# Patient Record
Sex: MALE | Race: WHITE | URBAN - METROPOLITAN AREA
[De-identification: names, ages, dates, MRNs, and addresses within clinical notes are randomized per-mention and may not be internally consistent; named-entity substitution may affect disease eponyms.]

---

## 2018-04-24 PROBLEM — E55.9 VITAMIN D DEFICIENCY: Status: ACTIVE | Noted: 2018-04-24

## 2018-04-24 PROBLEM — K64.8 OTHER HEMORRHOIDS: Status: ACTIVE | Noted: 2018-04-24

## 2018-12-07 ENCOUNTER — HOSPITAL ENCOUNTER (OUTPATIENT)
Age: 33
Setting detail: SPECIMEN
Discharge: HOME | End: 2018-12-07
Payer: COMMERCIAL

## 2018-12-12 LAB — SURGICAL PATHOLOGY REPORT: NORMAL

## 2020-04-09 ENCOUNTER — TELEMEDICINE (OUTPATIENT)
Dept: FAMILY MEDICINE CLINIC | Age: 35
End: 2020-04-09
Payer: COMMERCIAL

## 2020-04-09 VITALS
HEART RATE: 77 BPM | HEIGHT: 68 IN | DIASTOLIC BLOOD PRESSURE: 77 MMHG | BODY MASS INDEX: 22.73 KG/M2 | WEIGHT: 150 LBS | SYSTOLIC BLOOD PRESSURE: 126 MMHG

## 2020-04-09 PROCEDURE — G8428 CUR MEDS NOT DOCUMENT: HCPCS | Performed by: FAMILY MEDICINE

## 2020-04-09 PROCEDURE — 99202 OFFICE O/P NEW SF 15 MIN: CPT | Performed by: FAMILY MEDICINE

## 2020-04-09 ASSESSMENT — ENCOUNTER SYMPTOMS
SHORTNESS OF BREATH: 0
DIARRHEA: 0
COUGH: 0
EYES NEGATIVE: 1
ALLERGIC/IMMUNOLOGIC NEGATIVE: 1
CONSTIPATION: 0
BLOOD IN STOOL: 1
ABDOMINAL PAIN: 1

## 2020-04-09 NOTE — PROGRESS NOTES
Emotionally abused: None     Physically abused: None     Forced sexual activity: None   Other Topics Concern    None   Social History Narrative    None       SURGICAL HISTORY:    Past Surgical History:   Procedure Laterality Date    ADENOIDECTOMY      COLONOSCOPY  2018    Dr. Jamison Ruiz:    No current outpatient medications on file. No current facility-administered medications for this visit. ALLERGIES:   No Known Allergies    PHYSICAL EXAM:   Physical Exam  Vitals signs reviewed. Constitutional:       Appearance: Normal appearance. HENT:      Head: Normocephalic. Eyes:      Extraocular Movements: Extraocular movements intact. Conjunctiva/sclera: Conjunctivae normal.   Neck:      Musculoskeletal: Normal range of motion. Pulmonary:      Effort: Pulmonary effort is normal.   Musculoskeletal: Normal range of motion. Neurological:      General: No focal deficit present. Mental Status: He is alert and oriented to person, place, and time. Mental status is at baseline. Psychiatric:         Mood and Affect: Mood normal.         Behavior: Behavior normal.         Thought Content: Thought content normal.         Judgment: Judgment normal.          ASSESSMENT/PLAN:  1. Kidney cysts  Chronic, stable, continue current medication and/or plan  Get labs and US when able to get to lab   - Comprehensive Metabolic Panel; Future  - US RENAL COMPLETE; Future    2. Kidney stones  Report any sx of concern. - Comprehensive Metabolic Panel; Future  - US RENAL COMPLETE; Future    3. Establishing care with new doctor, encounter for  Reviewed history in record and with pt    4. FH: early coronary artery disease  Cont healthy lifestyle. - Lipid Panel; Future      Return in about 2 months (around 6/9/2020), or if symptoms worsen or fail to improve, for renal cysts.     Tom Arguello is a 38 y.o. male being evaluated by a Virtual Visit (video visit)

## 2020-05-12 RX ORDER — HYDROCORTISONE ACETATE 25 MG/1
25 SUPPOSITORY RECTAL 2 TIMES DAILY
Qty: 28 SUPPOSITORY | Refills: 1 | Status: SHIPPED | OUTPATIENT
Start: 2020-05-12 | End: 2020-05-26

## 2020-06-18 LAB
ALBUMIN SERPL-MCNC: NORMAL G/DL
ALP BLD-CCNC: NORMAL U/L
ALT SERPL-CCNC: NORMAL U/L
ANION GAP SERPL CALCULATED.3IONS-SCNC: NORMAL MMOL/L
AST SERPL-CCNC: NORMAL U/L
BILIRUB SERPL-MCNC: NORMAL MG/DL
BUN BLDV-MCNC: NORMAL MG/DL
CALCIUM SERPL-MCNC: NORMAL MG/DL
CHLORIDE BLD-SCNC: NORMAL MMOL/L
CHOLESTEROL, TOTAL: NORMAL
CHOLESTEROL/HDL RATIO: NORMAL
CO2: NORMAL
CREAT SERPL-MCNC: NORMAL MG/DL
GFR CALCULATED: NORMAL
GLUCOSE BLD-MCNC: NORMAL MG/DL
HDLC SERPL-MCNC: NORMAL MG/DL
LDL CHOLESTEROL CALCULATED: NORMAL
POTASSIUM SERPL-SCNC: NORMAL MMOL/L
SODIUM BLD-SCNC: NORMAL MMOL/L
TOTAL PROTEIN: NORMAL
TRIGL SERPL-MCNC: NORMAL MG/DL
VLDLC SERPL CALC-MCNC: NORMAL MG/DL

## 2020-06-26 ENCOUNTER — TELEPHONE (OUTPATIENT)
Dept: FAMILY MEDICINE CLINIC | Age: 35
End: 2020-06-26

## 2020-06-26 NOTE — TELEPHONE ENCOUNTER
Pt called to discuss his test results. He's concerned about his liver enzymes and triglycerides being elevated and want to know if this could mean he has a fatty liver? Also if he drinks just one beer he feels really intoxicated and wants to know this means something could be wrong with his liver? Should he get further testing done?  Please advise

## 2020-07-14 ENCOUNTER — TELEPHONE (OUTPATIENT)
Dept: FAMILY MEDICINE CLINIC | Age: 35
End: 2020-07-14

## 2020-07-14 NOTE — TELEPHONE ENCOUNTER
Wife called in for pt wanting antibody test faxed over to [facility] today so they can both be tested.

## 2020-07-21 ENCOUNTER — OFFICE VISIT (OUTPATIENT)
Dept: FAMILY MEDICINE CLINIC | Age: 35
End: 2020-07-21
Payer: COMMERCIAL

## 2020-07-21 VITALS
HEART RATE: 91 BPM | HEIGHT: 69 IN | SYSTOLIC BLOOD PRESSURE: 115 MMHG | DIASTOLIC BLOOD PRESSURE: 79 MMHG | BODY MASS INDEX: 22.81 KG/M2 | OXYGEN SATURATION: 97 % | WEIGHT: 154 LBS | TEMPERATURE: 97.4 F

## 2020-07-21 PROCEDURE — G8420 CALC BMI NORM PARAMETERS: HCPCS | Performed by: FAMILY MEDICINE

## 2020-07-21 PROCEDURE — G8427 DOCREV CUR MEDS BY ELIG CLIN: HCPCS | Performed by: FAMILY MEDICINE

## 2020-07-21 PROCEDURE — 99213 OFFICE O/P EST LOW 20 MIN: CPT | Performed by: FAMILY MEDICINE

## 2020-07-21 PROCEDURE — 1036F TOBACCO NON-USER: CPT | Performed by: FAMILY MEDICINE

## 2020-07-21 RX ORDER — CEPHALEXIN 500 MG/1
500 CAPSULE ORAL 3 TIMES DAILY
Qty: 21 CAPSULE | Refills: 0 | Status: SHIPPED | OUTPATIENT
Start: 2020-07-21 | End: 2020-07-28

## 2020-07-21 ASSESSMENT — ENCOUNTER SYMPTOMS
ALLERGIC/IMMUNOLOGIC NEGATIVE: 1
ABDOMINAL PAIN: 0
ROS SKIN COMMENTS: SEE HPI
COUGH: 0
SHORTNESS OF BREATH: 0
EYES NEGATIVE: 1

## 2020-07-21 NOTE — PROGRESS NOTES
[facility]  [facility]  [address]  [address] [address]  Dept: [phone]    7/21/2020    CHIEF COMPLAINT    Chief Complaint   Patient presents with    Insect Bite       HPI    Dana Reza is a 38 y.o. male who presents   Chief Complaint   Patient presents with   [address]   . Bitten on rt lower leg 2 days ago while mowing his lawn. [place] something bite him that he swatted away. Area has become bright red with a black center. Redness moving down toward medial ankle. No fever. No systemic sx other than some fatigue. Vitals:    07/21/20 1345   BP: 115/79   Site: Left Upper Arm   Position: Sitting   Cuff Size: Medium Adult   Pulse: 91   Temp: 97.4 °F (36.3 °C)   TempSrc: Temporal   SpO2: 97%   Weight: 154 lb (69.9 kg)   Height: 5' 9\" (1.753 m)       REVIEW OF SYSTEMS    Review of Systems   Constitutional: Positive for fatigue (mild). Negative for fever and unexpected weight change. HENT: Negative. Eyes: Negative. Respiratory: Negative for cough and shortness of breath. Cardiovascular: Negative for chest pain and leg swelling. Gastrointestinal: Negative for abdominal pain. Endocrine: Negative. Musculoskeletal: Negative. Negative for arthralgias. Skin: Negative. See hpi   Allergic/Immunologic: Negative. Neurological: Negative for dizziness and headaches. Hematological: Negative.         PAST MEDICAL HISTORY    Past Medical History:   Diagnosis Date    Alcohol intolerance     Cold sore     FH: early coronary artery disease     father and paternal relatives    Kidney cysts     but not polycystic kidney per urology    Kidney stones        FAMILY HISTORY    Family History   Problem Relation Age of Onset    Heart Disease Maternal Grandfather     Heart Disease Paternal Grandmother     Anxiety Disorder Paternal Grandmother     Dementia Paternal Grandmother     Dementia Paternal Grandfather     Heart Disease Paternal Uncle     High Cholesterol Maternal Grandmother     Other Maternal Grandmother         peripheral vascular    Heart Disease Father 54    High Blood Pressure Father     High Cholesterol Father     Heart Attack Father 72    Cancer Maternal Uncle 48        liver    High Cholesterol Maternal Uncle     Heart Disease Paternal Cousin 44    Diabetes Maternal Uncle 67       SOCIAL HISTORY    Social History     Socioeconomic History    Marital status:      Spouse name: Shayna    Number of children: 2    Years of education: None    Highest education level: None   Occupational History    Occupation:      Employer: [employer]     Comment: electrical engineering   Social Needs    Financial resource strain: None    Food insecurity     Worry: None     Inability: None    Transportation needs     Medical: None     Non-medical: None   Tobacco Use    Smoking status: Never Smoker    Smokeless tobacco: Never Used   Substance and Sexual Activity    Alcohol use:  Yes     Alcohol/week: 2.0 standard drinks     Types: 2 Cans of beer per week     Comment: 2x weekly ( 3 weekly)    Drug use: No    Sexual activity: Yes     Partners: Female   Lifestyle    Physical activity     Days per week: None     Minutes per session: None    Stress: None   Relationships    Social connections     Talks on phone: None     Gets together: None     Attends [employer]ology service: None     Active member of club or organization: None     Attends meetings of clubs or organizations: None     Relationship status: None    Intimate partner violence     Fear of current or ex partner: None     Emotionally abused: None     Physically abused: None     Forced sexual activity: None   Other Topics Concern    None   Social History Narrative    None       SURGICAL HISTORY    Past Surgical History:   Procedure Laterality Date    ADENOIDECTOMY      COLONOSCOPY  2018    Dr. Arjun Devries MEDICATIONS    Current Outpatient Medications   Medication Sig Dispense Refill    mupirocin (BACTROBAN) 2 % ointment Apply 3 times daily. 30 g 0    cephALEXin (KEFLEX) 500 MG capsule Take 1 capsule by mouth 3 times daily for 7 days 21 capsule 0     No current facility-administered medications for this visit. ALLERGIES    No Known Allergies        PHYSICAL EXAM   Physical Exam  Vitals signs reviewed. Constitutional:       Appearance: He is well-developed. HENT:      Head: Normocephalic. Eyes:      Pupils: Pupils are equal, round, and reactive to light. Neck:      Musculoskeletal: Normal range of motion and neck supple. Thyroid: No thyromegaly. Cardiovascular:      Rate and Rhythm: Normal rate and regular rhythm. Heart sounds: Normal heart sounds. No murmur. Pulmonary:      Effort: Pulmonary effort is normal.      Breath sounds: Normal breath sounds. No wheezing or rales. Abdominal:      Palpations: Abdomen is soft. Musculoskeletal: Normal range of motion. General: No tenderness or deformity. Lymphadenopathy:      Cervical: No cervical adenopathy. Skin:     General: Skin is warm and dry. Comments: See photo   Neurological:      Mental Status: He is alert and oriented to person, place, and time. Psychiatric:         Behavior: Behavior normal.         Assessment/PLan  1. Spider bite wound, accidental or unintentional, initial encounter  Suspect spider bite. Watch for increasing dark center which could be necrosis. Monitor for enlarging area of erythema. Call or return if worse or not improving. Keep clean. dtap up to date.   - mupirocin (BACTROBAN) 2 % ointment; Apply 3 times daily. Dispense: 30 g; Refill: 0  - cephALEXin (KEFLEX) 500 MG capsule; Take 1 capsule by mouth 3 times daily for 7 days  Dispense: 21 capsule;  Refill: 0      Alex received counseling on the following healthy behaviors: medication adherence  Reviewed prior labs and health maintenance. Continue current medications, diet and exercise. Discussed use, benefit, and side effects of prescribed medications. Barriers to medication compliance addressed. Patient given educational materials - see patient instructions. All patient questions answered. Patient voiced understanding. Return if symptoms worsen or fail to improve.         Electronically signed by Lynsey Holloway MD on 7/21/20 at 1:57 PM EDT

## 2020-07-22 ENCOUNTER — TELEPHONE (OUTPATIENT)
Dept: FAMILY MEDICINE CLINIC | Age: 35
End: 2020-07-22

## 2020-07-22 NOTE — TELEPHONE ENCOUNTER
Wife sent message that his bite was worse. He needs to go to ER or see derm today. Don't know if he can get in anywhere so may have to go to ER. Please advise pt.

## 2022-06-22 ENCOUNTER — HOSPITAL ENCOUNTER (OUTPATIENT)
Age: 37
Setting detail: SPECIMEN
Discharge: HOME | End: 2022-06-22

## 2022-06-22 LAB
ABSOLUTE EOS #: <0.03 K/UL (ref 0–0.44)
ABSOLUTE IMMATURE GRANULOCYTE: <0.03 K/UL (ref 0–0.3)
ABSOLUTE LYMPH #: 1.87 K/UL (ref 1.1–3.7)
ABSOLUTE MONO #: 0.46 K/UL (ref 0.1–1.2)
ALT SERPL-CCNC: 27 U/L (ref 5–41)
BASOPHILS # BLD: 1 % (ref 0–2)
BASOPHILS ABSOLUTE: 0.03 K/UL (ref 0–0.2)
CREAT SERPL-MCNC: 0.82 MG/DL (ref 0.7–1.2)
EOSINOPHILS RELATIVE PERCENT: 0 % (ref 1–4)
GFR AFRICAN AMERICAN: >60 ML/MIN
GFR NON-AFRICAN AMERICAN: >60 ML/MIN
GFR SERPL CREATININE-BSD FRML MDRD: NORMAL ML/MIN/{1.73_M2}
HCT VFR BLD CALC: 46.2 % (ref 40.7–50.3)
HEMOGLOBIN: 16.1 G/DL (ref 13–17)
IMMATURE GRANULOCYTES: 0 %
LYMPHOCYTES # BLD: 37 % (ref 24–43)
MCH RBC QN AUTO: 30.1 PG (ref 25.2–33.5)
MCHC RBC AUTO-ENTMCNC: 34.8 G/DL (ref 28.4–34.8)
MCV RBC AUTO: 86.4 FL (ref 82.6–102.9)
MONOCYTES # BLD: 9 % (ref 3–12)
NRBC AUTOMATED: 0 PER 100 WBC
PDW BLD-RTO: 12.8 % (ref 11.8–14.4)
PLATELET # BLD: 239 K/UL (ref 138–453)
PMV BLD AUTO: 9.7 FL (ref 8.1–13.5)
RBC # BLD: 5.35 M/UL (ref 4.21–5.77)
RHEUMATOID FACTOR: <10 IU/ML
SEDIMENTATION RATE, ERYTHROCYTE: 1 MM/HR (ref 0–15)
SEG NEUTROPHILS: 53 % (ref 36–65)
SEGMENTED NEUTROPHILS ABSOLUTE COUNT: 2.65 K/UL (ref 1.5–8.1)
WBC # BLD: 5 K/UL (ref 3.5–11.3)

## 2022-06-23 LAB
ALLERGEN HONEY BEE IGE: <0.1 KU/L (ref 0–0.34)
ALLERGEN WHITE-FACED HORNET IGE: <0.1 KU/L (ref 0–0.34)
ANTI DNA DOUBLE STRANDED: <0.5 IU/ML
ANTI-NUCLEAR ANTIBODY (ANA): NEGATIVE
COMMON WASP, YELLOW JACKET IGE: 0.13 KU/L (ref 0–0.34)
ENA ANTIBODIES SCREEN: 0.1 U/ML
LATEX IGE: <0.1 KU/L (ref 0–0.34)
PAPER WASP IGE CLASS: <0.1 KU/L (ref 0–0.34)
YELLOW HORNET IGE: <0.1 KU/L (ref 0–0.34)

## 2022-06-25 LAB — TRYPTASE: 5.5 UG/L

## 2023-07-24 ENCOUNTER — OFFICE VISIT (OUTPATIENT)
Age: 38
End: 2023-07-24
Payer: COMMERCIAL

## 2023-07-24 VITALS
HEIGHT: 68 IN | SYSTOLIC BLOOD PRESSURE: 144 MMHG | WEIGHT: 150.4 LBS | DIASTOLIC BLOOD PRESSURE: 80 MMHG | HEART RATE: 74 BPM | BODY MASS INDEX: 22.79 KG/M2
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
## 2023-07-24 DIAGNOSIS — R20.0 NUMBNESS AND TINGLING IN LEFT ARM: ICD-10-CM

## 2023-07-24 DIAGNOSIS — M54.6 ACUTE BILATERAL THORACIC BACK PAIN: ICD-10-CM

## 2023-07-24 DIAGNOSIS — M54.9 ACUTE UPPER BACK PAIN: Primary | ICD-10-CM

## 2023-07-24 DIAGNOSIS — R20.2 NUMBNESS AND TINGLING IN LEFT ARM: ICD-10-CM

## 2023-07-24 PROCEDURE — 99213 OFFICE O/P EST LOW 20 MIN: CPT | Performed by: FAMILY MEDICINE

## 2023-07-24 RX ORDER — VALACYCLOVIR HYDROCHLORIDE 1 G/1
2 TABLET, FILM COATED ORAL DAILY PRN
COMMUNITY
Start: 2022-04-01

## 2023-07-24 RX ORDER — CHLORAL HYDRATE 500 MG
1 CAPSULE ORAL DAILY
COMMUNITY

## 2023-07-24 RX ORDER — LEVOCETIRIZINE DIHYDROCHLORIDE 5 MG/1
1 TABLET, FILM COATED ORAL EVERY 24 HOURS
COMMUNITY
End: 2023-07-24

## 2023-07-24 RX ORDER — PANTOPRAZOLE SODIUM 40 MG/1
40 TABLET, DELAYED RELEASE ORAL DAILY
COMMUNITY
Start: 2023-04-26 | End: 2023-07-24

## 2023-07-24 RX ORDER — CYCLOBENZAPRINE HCL 10 MG
10 TABLET ORAL EVERY 8 HOURS PRN
COMMUNITY
Start: 2023-07-22 | End: 2023-07-25

## 2023-07-24 RX ORDER — MULTIVIT-MIN/IRON/FOLIC ACID/K 18-600-40
1 CAPSULE ORAL DAILY
COMMUNITY

## 2023-07-24 RX ORDER — IBUPROFEN 200 MG
1 TABLET ORAL 3 TIMES DAILY PRN
COMMUNITY

## 2023-07-24 RX ORDER — CREATINE MONOHYDRATE 100 %
5 POWDER (GRAM) MISCELLANEOUS DAILY
COMMUNITY

## 2023-07-24 RX ORDER — ACETAMINOPHEN, ASPIRIN AND CAFFEINE 250; 250; 65 MG/1; MG/1; MG/1
2 TABLET, FILM COATED ORAL DAILY PRN
COMMUNITY

## 2023-07-24 SDOH — ECONOMIC STABILITY: FOOD INSECURITY: WITHIN THE PAST 12 MONTHS, YOU WORRIED THAT YOUR FOOD WOULD RUN OUT BEFORE YOU GOT MONEY TO BUY MORE.: NEVER TRUE

## 2023-07-24 SDOH — ECONOMIC STABILITY: HOUSING INSECURITY
IN THE LAST 12 MONTHS, WAS THERE A TIME WHEN YOU DID NOT HAVE A STEADY PLACE TO SLEEP OR SLEPT IN A SHELTER (INCLUDING NOW)?: NO

## 2023-07-24 SDOH — ECONOMIC STABILITY: FOOD INSECURITY: WITHIN THE PAST 12 MONTHS, THE FOOD YOU BOUGHT JUST DIDN'T LAST AND YOU DIDN'T HAVE MONEY TO GET MORE.: NEVER TRUE

## 2023-07-24 SDOH — ECONOMIC STABILITY: INCOME INSECURITY: HOW HARD IS IT FOR YOU TO PAY FOR THE VERY BASICS LIKE FOOD, HOUSING, MEDICAL CARE, AND HEATING?: NOT HARD AT ALL

## 2023-07-24 ASSESSMENT — PATIENT HEALTH QUESTIONNAIRE - PHQ9
SUM OF ALL RESPONSES TO PHQ9 QUESTIONS 1 & 2: 0
SUM OF ALL RESPONSES TO PHQ QUESTIONS 1-9: 0
1. LITTLE INTEREST OR PLEASURE IN DOING THINGS: 0
SUM OF ALL RESPONSES TO PHQ QUESTIONS 1-9: 0
SUM OF ALL RESPONSES TO PHQ QUESTIONS 1-9: 0
2. FEELING DOWN, DEPRESSED OR HOPELESS: 0
SUM OF ALL RESPONSES TO PHQ QUESTIONS 1-9: 0

## 2023-07-24 NOTE — PATIENT INSTRUCTIONS
Thank you for following up with us at [facility] outpatient residency clinic! It was a pleasure to meet you today! Our plan is the following:  - I'll see you in one week to recheck the strength and sensation in your left hand. - Keep using ice/heat and OTC NSAIDs to help with the pain. You can do low-impact cardio for exercise, but I want you to avoid any weight-lifting. If you have any additional questions or concerns, please call the office ([phone]) and speak to one of the staff. They will triage and forward the message to the doctors! Have a great rest of your day!

## 2023-07-24 NOTE — PROGRESS NOTES
[address] [facility] Medicine Residency  [address] [place] [address], [facility]  Phone: [phone]  Fax: [phone]      Date of Visit:  2023  Patient Name: Lona Osorio   Patient :  1985     HPI:     Lona Osorio is a 34 y.o. male who presents today to discuss   Chief Complaint   Patient presents with    Back Pain     Cervical spine pain      Pt presents to the office to follow-up after ED visit for neck/upper back pain. Reports he was doing CrossFit (long-term activity, has been doing it for 6mo) and was doing a pushup handstand when he lowered himself to far down, causing his neck to hyperflex (pushing his chin to his chest). Pt felt a weird sensation in the back of his neck, but was able to continue on with his exercise. Went for a run, then did a kettlebell workout, pullups, and then went for another run. When he returned from the last run, the back of his neck felt numb and he experienced numbness in his left arm. The following am, pt experienced constant pain in neck/upper back, with limited ROM in neck and shoulders. Went for evaluation in ED, and cervical spine x-ray showed no abnormalities. Has been using ice and heat, ibuprofen 400 mg q6h. during evaluation today, patient reports left arm numbness, but has improved since injury. Is experiencing random shooting pains on both sides of upper thoracic back/shoulders, but pain is worse on left side. Patient feels limitation to ROMs have improved with time as well. Is asking today when he is able to return to [facility]. I personally reviewed the patient's past medical history, current medications, allergies, surgical history, family history and social history. Updates were made as necessary. REVIEW OF SYSTEM      As noted above in HPI.     REVIEWED INFORMATION      Allergies   Allergen Reactions    Azithromycin      Other reaction(s): GI Distress    Doxycycline Nausea Only

## 2023-07-26 RX ORDER — ROSUVASTATIN CALCIUM 5 MG/1
5 TABLET, COATED ORAL DAILY
Qty: 30 TABLET | Refills: 14 | COMMUNITY
Start: 2023-07-25 | End: 2024-10-17

## 2023-07-26 SDOH — ECONOMIC STABILITY: TRANSPORTATION INSECURITY
IN THE PAST 12 MONTHS, HAS LACK OF TRANSPORTATION KEPT YOU FROM MEETINGS, WORK, OR FROM GETTING THINGS NEEDED FOR DAILY LIVING?: NO

## 2023-07-26 SDOH — ECONOMIC STABILITY: TRANSPORTATION INSECURITY
IN THE PAST 12 MONTHS, HAS THE LACK OF TRANSPORTATION KEPT YOU FROM MEDICAL APPOINTMENTS OR FROM GETTING MEDICATIONS?: NO

## 2023-07-26 ASSESSMENT — LIFESTYLE VARIABLES
HOW OFTEN DO YOU HAVE A DRINK CONTAINING ALCOHOL: NEVER
HOW MANY STANDARD DRINKS CONTAINING ALCOHOL DO YOU HAVE ON A TYPICAL DAY: PATIENT DOES NOT DRINK

## 2023-07-31 ENCOUNTER — OFFICE VISIT (OUTPATIENT)
Age: 38
End: 2023-07-31
Payer: COMMERCIAL

## 2023-07-31 VITALS
BODY MASS INDEX: 23.04 KG/M2 | HEIGHT: 68 IN | RESPIRATION RATE: 16 BRPM | SYSTOLIC BLOOD PRESSURE: 110 MMHG | HEART RATE: 74 BPM | TEMPERATURE: 97.4 F | WEIGHT: 152 LBS | DIASTOLIC BLOOD PRESSURE: 84 MMHG

## 2023-07-31 DIAGNOSIS — M54.9 ACUTE UPPER BACK PAIN: ICD-10-CM

## 2023-07-31 DIAGNOSIS — H10.9 BACTERIAL CONJUNCTIVITIS: Primary | ICD-10-CM
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
## 2023-07-31 PROCEDURE — 99213 OFFICE O/P EST LOW 20 MIN: CPT | Performed by: FAMILY MEDICINE

## 2023-07-31 RX ORDER — POLYMYXIN B SULFATE AND TRIMETHOPRIM 1; 10000 MG/ML; [USP'U]/ML
1 SOLUTION OPHTHALMIC EVERY 4 HOURS
Qty: 10 ML | Refills: 0 | Status: SHIPPED | OUTPATIENT
Start: 2023-07-31 | End: 2023-08-07

## 2023-07-31 NOTE — PROGRESS NOTES
[address] [facility] Family Medicine Residency  [address] [facility], [address]  Phone: [phone]  Fax: [phone]      Date of Visit:  2023  Patient Name: Jackelyn Rodriguez   Patient :  1985     HPI:     Jackelyn Rodriguez is a 34 y.o. male who presents today to discuss   Chief Complaint   Patient presents with    Follow-up     Follow up on strength of neck/arm  Also check right eye, pink since Friday, tried old Poly drops, feels like has cold also     Pt presenting to the office to discuss Pt was having pain with movement and decreased range of movement in neck. Was experiencing numbness in left arm from shoulder all the way down. Today, pt denies numbness or shooting pain in left arm. ROM has improved, now only noticing pain in left neck/shoulder with cervical rotation to the left. No point tenderness; has been using ice and heat, has not needed NSAIDs for several days now. Also presenting w/ c/o pink eye in right eye. States he began experiencing congestion and feeling ill on Friday, but denies any fever. Feels Puffy under the eye, next morning, noticed yellow discharge, thick. , had trouble opening his eye because of discharge. Now eye just feels bruised. Had unopened polymyxin B drops at home, began using on Saturday. Denies fever, endorses chills and mild myalgias. Of note, patient was recently seen by cardiologist at [facility] clinic, started on Crestor 5 mg, so patient stated he was not sure how many of the systemic infection symptoms were d/t illness vs side effect of statin initiation. I personally reviewed the patient's past medical history, current medications, allergies, surgical history, family history and social history. Updates were made as necessary. REVIEW OF SYSTEM      As noted above in HPI.      REVIEWED INFORMATION      Allergies   Allergen Reactions    Azithromycin      Other reaction(s): GI Distress    Doxycycline Nausea

## 2023-07-31 NOTE — PATIENT INSTRUCTIONS
Thank you for following up with us at [address] [facility]. office! It was a pleasure to meet you today! Our plan is the following:  - Continue using the Polymixin-trimethoprim eye drops. Apply 1 drops in the affected eye 4 times a day for a total of 7 days. I'll send a prescription to the pharmacy just in case. You are safe to go back to work after 24hrs of treatment, just make sure not to touch your eye. - As for your neck, you are okay to go back to cardio, but I'd like you to hold off on lifting until the neck pain resolved. Your medication list is included in the after visit summary. If you find any differences when compared to your medications at home, please contact the office ([phone]) to let us know. You can also call the office if you have any additional questions or concerns and speak to one of the staff. They will triage and forward the message to the provider. Have a great rest of your day!

## 2023-08-21 ENCOUNTER — PATIENT MESSAGE (OUTPATIENT)
Age: 38
End: 2023-08-21

## 2023-08-21 DIAGNOSIS — G47.26 SHIFTING SLEEP-WORK SCHEDULE: Primary | ICD-10-CM

## 2023-08-22 RX ORDER — ZOLPIDEM TARTRATE 10 MG/1
10 TABLET ORAL NIGHTLY PRN
Qty: 14 TABLET | Refills: 0 | Status: SHIPPED | OUTPATIENT
Start: 2023-08-22 | End: 2023-09-05

## 2023-09-12 PROBLEM — E78.2 MIXED HYPERLIPIDEMIA: Status: ACTIVE | Noted: 2023-09-12

## 2023-09-12 PROBLEM — G43.009 MIGRAINE WITHOUT AURA AND WITHOUT STATUS MIGRAINOSUS, NOT INTRACTABLE: Status: ACTIVE | Noted: 2023-09-12

## 2023-09-13 ENCOUNTER — OFFICE VISIT (OUTPATIENT)
Age: 38
End: 2023-09-13
Payer: COMMERCIAL

## 2023-09-13 VITALS
HEART RATE: 84 BPM | BODY MASS INDEX: 22.94 KG/M2 | SYSTOLIC BLOOD PRESSURE: 123 MMHG | DIASTOLIC BLOOD PRESSURE: 73 MMHG | HEIGHT: 68 IN | TEMPERATURE: 98 F | RESPIRATION RATE: 14 BRPM | WEIGHT: 151.4 LBS

## 2023-09-13 DIAGNOSIS — L65.9 ALOPECIA: ICD-10-CM

## 2023-09-13 DIAGNOSIS — Z11.59 NEED FOR HEPATITIS C SCREENING TEST: ICD-10-CM

## 2023-09-13 DIAGNOSIS — E78.2 MIXED HYPERLIPIDEMIA: ICD-10-CM

## 2023-09-13 DIAGNOSIS — Z00.00 ENCOUNTER FOR WELL ADULT EXAM WITHOUT ABNORMAL FINDINGS: ICD-10-CM

## 2023-09-13 DIAGNOSIS — G43.009 MIGRAINE WITHOUT AURA AND WITHOUT STATUS MIGRAINOSUS, NOT INTRACTABLE: ICD-10-CM

## 2023-09-13 DIAGNOSIS — R35.1 NOCTURIA: ICD-10-CM

## 2023-09-13 DIAGNOSIS — Z00.00 VISIT FOR WELL MAN HEALTH CHECK: Primary | ICD-10-CM

## 2023-09-13 DIAGNOSIS — Z72.89 OTHER PROBLEMS RELATED TO LIFESTYLE: ICD-10-CM

## 2023-09-13 PROCEDURE — 99211 OFF/OP EST MAY X REQ PHY/QHP: CPT

## 2023-09-13 PROCEDURE — 99396 PREV VISIT EST AGE 40-64: CPT | Performed by: FAMILY MEDICINE

## 2023-09-13 RX ORDER — UBIDECARENONE 200 MG
200 CAPSULE ORAL DAILY
COMMUNITY

## 2023-09-13 ASSESSMENT — ENCOUNTER SYMPTOMS
EYES NEGATIVE: 1
RESPIRATORY NEGATIVE: 1

## 2023-09-13 NOTE — PROGRESS NOTES
Cervical back: Neck supple. No tenderness. Right lower leg: No edema. Left lower leg: No edema. Lymphadenopathy:      Cervical: No cervical adenopathy. Skin:     General: Skin is warm and dry. Findings: No lesion or rash. Neurological:      Mental Status: He is alert and oriented to person, place, and time. Cranial Nerves: No cranial nerve deficit. Deep Tendon Reflexes: Reflexes normal.   Psychiatric:         Mood and Affect: Mood normal.         Thought Content: Thought content normal.         ASSESSMENT/PLAN     1. Visit for well man health check  2. Mixed hyperlipidemia  3. Migraine without aura and without status migrainosus, not intractable  4. Nocturia  -     PSA, Diagnostic; Future  5. Alopecia  -     TSH; Future  6. Encounter for well adult exam without abnormal findings  7. Other problems related to lifestyle  8. Need for hepatitis C screening test  -     Hepatitis C Antibody; Future       There are no discontinued medications. No orders of the defined types were placed in this encounter. Return in about 1 year (around 9/13/2024).     COMMUNICATION:       Electronically signed by Rupali Youssef MD on 9/13/2023 at 3:06 PM

## 2023-09-19 ENCOUNTER — HOSPITAL ENCOUNTER (OUTPATIENT)
Age: 38
Setting detail: SPECIMEN
Discharge: HOME | End: 2023-09-19

## 2023-09-19 DIAGNOSIS — R35.1 NOCTURIA: ICD-10-CM

## 2023-09-19 DIAGNOSIS — Z11.59 NEED FOR HEPATITIS C SCREENING TEST: ICD-10-CM

## 2023-09-19 DIAGNOSIS — L65.9 ALOPECIA: ICD-10-CM

## 2023-09-19 LAB
HCV AB SERPL QL IA: NONREACTIVE
PSA SERPL-MCNC: 0.52 NG/ML
TSH SERPL DL<=0.05 MIU/L-ACNC: 1.63 UIU/ML (ref 0.27–4.2)

## 2023-09-20 DIAGNOSIS — L65.9 ALOPECIA: Primary | ICD-10-CM

## 2023-09-20 RX ORDER — FINASTERIDE 1 MG/1
1 TABLET, FILM COATED ORAL DAILY
Qty: 90 TABLET | Refills: 1 | Status: SHIPPED | OUTPATIENT
Start: 2023-09-20

## 2023-10-10 ENCOUNTER — HOSPITAL ENCOUNTER (OUTPATIENT)
Dept: GENERAL RADIOLOGY | Age: 38
Discharge: HOME | End: 2023-10-12
Payer: COMMERCIAL

## 2023-10-10 ENCOUNTER — OFFICE VISIT (OUTPATIENT)
Age: 38
End: 2023-10-10
Payer: COMMERCIAL

## 2023-10-10 ENCOUNTER — HOSPITAL ENCOUNTER (OUTPATIENT)
Age: 38
Discharge: HOME | End: 2023-10-12
Payer: COMMERCIAL

## 2023-10-10 ENCOUNTER — HOSPITAL ENCOUNTER (OUTPATIENT)
Age: 38
Setting detail: SPECIMEN
Discharge: HOME | End: 2023-10-10

## 2023-10-10 VITALS
HEART RATE: 84 BPM | WEIGHT: 151 LBS | RESPIRATION RATE: 12 BRPM | DIASTOLIC BLOOD PRESSURE: 68 MMHG | SYSTOLIC BLOOD PRESSURE: 106 MMHG | BODY MASS INDEX: 22.96 KG/M2 | TEMPERATURE: 99.1 F

## 2023-10-10 DIAGNOSIS — R09.89 CHEST CONGESTION: ICD-10-CM

## 2023-10-10 DIAGNOSIS — R05.1 ACUTE COUGH: ICD-10-CM

## 2023-10-10 DIAGNOSIS — J06.9 VIRAL URI: Primary | ICD-10-CM

## 2023-10-10 DIAGNOSIS — M79.10 MYALGIA: ICD-10-CM

## 2023-10-10 DIAGNOSIS — Z78.9 NON-SMOKER: ICD-10-CM

## 2023-10-10 DIAGNOSIS — R09.82 POST-NASAL DRIP: ICD-10-CM

## 2023-10-10 DIAGNOSIS — T73.2XXA FATIGUE DUE TO EXPOSURE, INITIAL ENCOUNTER: ICD-10-CM

## 2023-10-10 PROCEDURE — 71046 X-RAY EXAM CHEST 2 VIEWS: CPT

## 2023-10-10 PROCEDURE — 99213 OFFICE O/P EST LOW 20 MIN: CPT

## 2023-10-10 PROCEDURE — 99211 OFF/OP EST MAY X REQ PHY/QHP: CPT

## 2023-10-10 RX ORDER — BENZONATATE 100 MG/1
100 CAPSULE ORAL 3 TIMES DAILY PRN
COMMUNITY

## 2023-10-10 ASSESSMENT — ENCOUNTER SYMPTOMS
VOMITING: 0
RHINORRHEA: 1
SINUS PRESSURE: 1
COUGH: 1
BLOOD IN STOOL: 0
DIARRHEA: 0
EYE PAIN: 0
SHORTNESS OF BREATH: 1
EYE DISCHARGE: 0
SINUS PAIN: 1
WHEEZING: 1
NAUSEA: 1
SORE THROAT: 1

## 2023-10-10 NOTE — PROGRESS NOTES
neck supple. Skin:     General: Skin is warm and dry. Capillary Refill: Capillary refill takes less than 2 seconds. Coloration: Skin is not jaundiced. Findings: No rash. Neurological:      General: No focal deficit present. Mental Status: He is alert and oriented to person, place, and time. Mental status is at baseline. Cranial Nerves: No cranial nerve deficit.    Psychiatric:         Mood and Affect: Mood normal.         Behavior: Behavior normal.         Electronically signed by Sanjuanita Nielsen MD on 10/10/2023 at 8:11 PM

## 2023-10-10 NOTE — PATIENT INSTRUCTIONS
Thank you for coming to the clinic today  -As discussed we will send you for a chest x-ray  -Depending on what the x-ray shows we will send you medications for pneumonia or for sinusitis  -We will also get influenza swabs  -If you have a COVID-19 home test go ahead and test again and message us the results  -I would recommend supportive therapy with Flonase 2 puffs in each nare aiming towards the ear when spraying  -Cough drops to help suppress your cough and provide comfort  -Honey has been shown to help fight against coughing and infection  -Warm steamy showers have been shown to help clear the nasal passageways  -You can also try nasal rinses prior to using Flonase  -Motrin and Tylenol for any body aches and fevers  -Please feel free to send us any nonurgent medical questions through my portal or call for more urgent medical questions speak with the on-call physician

## 2023-10-11 LAB
FLUAV AG SPEC QL: NEGATIVE
FLUBV AG SPEC QL: NEGATIVE

## 2023-10-15 ENCOUNTER — PATIENT MESSAGE (OUTPATIENT)
Age: 38
End: 2023-10-15

## 2023-10-15 DIAGNOSIS — B96.89 ACUTE BACTERIAL SINUSITIS: Primary | ICD-10-CM

## 2023-10-15 DIAGNOSIS — J01.90 ACUTE BACTERIAL SINUSITIS: Primary | ICD-10-CM

## 2023-10-16 RX ORDER — DOXYCYCLINE HYCLATE 100 MG
100 TABLET ORAL 2 TIMES DAILY
Qty: 10 TABLET | Refills: 0 | Status: SHIPPED | OUTPATIENT
Start: 2023-10-16 | End: 2023-10-21

## 2023-10-16 RX ORDER — ONDANSETRON 4 MG/1
4 TABLET, ORALLY DISINTEGRATING ORAL 3 TIMES DAILY PRN
Qty: 21 TABLET | Refills: 0 | Status: SHIPPED | OUTPATIENT
Start: 2023-10-16 | End: 2023-10-23

## 2023-10-16 RX ORDER — AMOXICILLIN AND CLAVULANATE POTASSIUM 875; 125 MG/1; MG/1
1 TABLET, FILM COATED ORAL 2 TIMES DAILY
Qty: 10 TABLET | Refills: 0 | Status: SHIPPED | OUTPATIENT
Start: 2023-10-16 | End: 2023-10-21

## 2023-10-16 NOTE — TELEPHONE ENCOUNTER
-Spoke with patient over the phone today, patient developed worsening of his cough and nasal congestion over the weekend. This has been going on for several weeks now and has presented with on again off again symptoms. A shared decision has been made to start antibiotic therapy. Patient cannot tolerate azithromycin due to severe GI distress. Patient reported that he developed nausea a while back when trying doxycycline reports he thinks he can tolerate the medication with no anaphylactic-like reactions.     -We will prescribe amoxicillin/clavulanate plus doxycycline for 5 days with Zofran for nausea.   If patient is unable to tolerate we will move up to the fluoroquinolones for relief.      -I would like to see patient back in office in 1 week for reassessment.    -Augmentin 875 mg immediate release twice daily 5 days  -Doxycycline 100 mg twice daily for 5 days  -Zofran 4 mg as needed for nausea    -Fariba Thomas MD

## 2023-10-18 NOTE — TELEPHONE ENCOUNTER
Patient called back and stated that he will call back in a few days after he see's how he is feeling

## 2023-10-19 NOTE — TELEPHONE ENCOUNTER
Yes, this is a Dr. Ying Alanis patient that I saw for an acute visit in office and have been managing. I have started him on antibiotic therapy for possible acute bacterial sinusitis with a request to see him in office for follow-up and reevaluation in 1 week. If patient does not want to come into the office that is his choice. I am not sure what is going on with this message chain, I see that Dr. Glen Hansen may have been sent a message regarding his care as well as Dr. Raman Man    Please make sure to send any further questions to myself (Dr. Summer Clay) or to Dr. Ying Alanis regarding this patients care.  Thank you.     -Remigio Wong MD

## 2023-10-23 NOTE — TELEPHONE ENCOUNTER
Called to check on patient, she state he feels so much better. The antibiotic helped significantly. Also patient drank Margaret Saras and that helped as well with any stomach issues. He would like to cancel appt with Dr Jadyn Oshea 11/7/2023, state he feels a lot better. Reviewed emergent symptoms with patient on when to seek medical attention or call our office, patient verbalized understanding. Task completed.

## 2023-11-06 ENCOUNTER — HOSPITAL ENCOUNTER (OUTPATIENT)
Age: 38
Setting detail: SPECIMEN
Discharge: HOME | End: 2023-11-06

## 2023-11-06 LAB
CHOLEST SERPL-MCNC: 122 MG/DL
CHOLESTEROL/HDL RATIO: 2.7
CRP SERPL HS-MCNC: 0.2 MG/L
GLUCOSE SERPL-MCNC: 87 MG/DL (ref 70–99)
HDLC SERPL-MCNC: 45 MG/DL
INSULIN REFERENCE RANGE:: NORMAL
INSULIN: 11.7 MU/L
LDLC SERPL CALC-MCNC: 60 MG/DL (ref 0–130)
TRIGL SERPL-MCNC: 84 MG/DL
URATE SERPL-MCNC: 7 MG/DL (ref 3.4–7)

## 2023-11-08 LAB
APO A-I SERPL-MCNC: 141 MG/DL (ref 104–202)
APO B100 SERPL-MCNC: 69 MG/DL (ref 66–133)

## 2024-03-22 DIAGNOSIS — L65.9 ALOPECIA: ICD-10-CM

## 2024-03-25 RX ORDER — FINASTERIDE 1 MG/1
1 TABLET, FILM COATED ORAL DAILY
Qty: 90 TABLET | Refills: 0 | Status: SHIPPED | OUTPATIENT
Start: 2024-03-25

## 2024-05-21 ENCOUNTER — OFFICE VISIT (OUTPATIENT)
Age: 39
End: 2024-05-21
Payer: COMMERCIAL

## 2024-05-21 VITALS
HEART RATE: 81 BPM | OXYGEN SATURATION: 99 % | TEMPERATURE: 98.3 F | WEIGHT: 152.8 LBS | SYSTOLIC BLOOD PRESSURE: 117 MMHG | BODY MASS INDEX: 23.16 KG/M2 | HEIGHT: 68 IN | RESPIRATION RATE: 16 BRPM | DIASTOLIC BLOOD PRESSURE: 84 MMHG

## 2024-05-21 DIAGNOSIS — H66.003 NON-RECURRENT ACUTE SUPPURATIVE OTITIS MEDIA OF BOTH EARS WITHOUT SPONTANEOUS RUPTURE OF TYMPANIC MEMBRANES: Primary | ICD-10-CM

## 2024-05-21 PROCEDURE — 99213 OFFICE O/P EST LOW 20 MIN: CPT

## 2024-05-21 RX ORDER — AMOXICILLIN AND CLAVULANATE POTASSIUM 875; 125 MG/1; MG/1
1 TABLET, FILM COATED ORAL 2 TIMES DAILY
Qty: 14 TABLET | Refills: 0 | Status: SHIPPED | OUTPATIENT
Start: 2024-05-21 | End: 2024-05-28

## 2024-05-21 RX ORDER — PANTOPRAZOLE SODIUM 40 MG/1
40 TABLET, DELAYED RELEASE ORAL PRN
COMMUNITY
Start: 2023-04-26

## 2024-05-21 RX ORDER — EZETIMIBE 10 MG/1
10 TABLET ORAL DAILY
COMMUNITY
Start: 2023-11-20 | End: 2025-02-12

## 2024-05-21 ASSESSMENT — PATIENT HEALTH QUESTIONNAIRE - PHQ9
1. LITTLE INTEREST OR PLEASURE IN DOING THINGS: NOT AT ALL
SUM OF ALL RESPONSES TO PHQ QUESTIONS 1-9: 0
SUM OF ALL RESPONSES TO PHQ QUESTIONS 1-9: 0
2. FEELING DOWN, DEPRESSED OR HOPELESS: NOT AT ALL
SUM OF ALL RESPONSES TO PHQ QUESTIONS 1-9: 0
SUM OF ALL RESPONSES TO PHQ QUESTIONS 1-9: 0
SUM OF ALL RESPONSES TO PHQ9 QUESTIONS 1 & 2: 0

## 2024-05-21 NOTE — PATIENT INSTRUCTIONS
Thank you for following up with us at [facility] outpatient residency clinic. It was a pleasure to see you today.    Our plan is the following:  -A prescription for Augmentin has been sent to your pharmacy.  Please take twice a day for the next 7 days.  -Continue using Flonase daily to help open/drain the eustachian tube.  -You may also try either Sudafed or Afrin nasal spray the day of flying to help open the eustachian tube and prevent altitude pressure.  -If you develop any fevers chills or worsening symptoms please let me know.  -Enjoy your trip to [facility].    If you have any additional questions or concerns, please call the office ([phone]) and speak to one of the staff. They will triage and forward the message to the doctors. Have a great rest of your day.

## 2024-05-21 NOTE — PROGRESS NOTES
[facility]  [address] [place] [address]  Phone: [phone]  Fax: [phone]      Date of Visit:  2024  Patient Name: Alex Mcgowan   Patient :  1985     HPI:     Alex Mcgowan is a 39 y.o. male who presents today to discuss   Chief Complaint   Patient presents with    Cough    Ear Fullness    Sore Throat     1.5. Patient is traveling to [facility] soon and concern before he goes.     Sputum     Yellow.      Patient presents with a 1.5-week history of nasal congestion, ear fullness, sore throat and sinus pressure.  Patient reports that his 3 children have had various viral illnesses recently.  He has history of eustachian tube dysfunction.  He reports that the sinus drainage has become thicker and with a yellowish tinge.  Patient denies any allergies to antibiotics.  He does report that azithromycin has caused some upset stomach in the past.  Patient denies any drainage or evidence of eardrum rupture.  Patient denies fever.  He reports he may have experienced some chills.  Patient denies any chest pain, shortness of breath or difficulty breathing.    I personally reviewed the patient's past medical history, current medications, allergies, surgical history, family history and social history.  Updates were made as necessary.    REVIEW OF SYSTEM      As per HPI    REVIEWED INFORMATION      Allergies   Allergen Reactions    Azithromycin      Other reaction(s): GI Distress    Doxycycline Nausea Only     Nausea  Nausea      Wasp Venom Hives       Patient Active Problem List   Diagnosis    Renal cysts, congenital, bilateral    Nephrolithiasis    Vitamin D deficiency    Kidney cysts    Kidney stones    FH: early coronary artery disease    Mixed hyperlipidemia    Migraine without aura and without status migrainosus, not intractable       Past Medical History:   Diagnosis Date    Alcohol intolerance     Cold sore     Eczema     FH:

## 2024-07-10 DIAGNOSIS — L65.9 ALOPECIA: ICD-10-CM

## 2024-07-10 RX ORDER — FINASTERIDE 1 MG/1
1 TABLET, FILM COATED ORAL DAILY
Qty: 90 TABLET | Refills: 0 | Status: SHIPPED | OUTPATIENT
Start: 2024-07-10

## 2024-08-26 ENCOUNTER — TELEPHONE (OUTPATIENT)
Age: 39
End: 2024-08-26

## 2024-08-26 NOTE — TELEPHONE ENCOUNTER
Patient called and stated that he tested positive for covid this morning and he wanted to know if he can get a script for paxlovid

## 2024-09-06 RX ORDER — ROSUVASTATIN CALCIUM 5 MG/1
5 TABLET, COATED ORAL DAILY
Qty: 30 TABLET | Refills: 14 | Status: SHIPPED | OUTPATIENT
Start: 2024-09-06 | End: 2025-11-30

## 2024-09-30 ENCOUNTER — PATIENT MESSAGE (OUTPATIENT)
Age: 39
End: 2024-09-30

## 2024-09-30 DIAGNOSIS — F51.02 ADJUSTMENT INSOMNIA: Primary | ICD-10-CM

## 2024-10-01 RX ORDER — ZOLPIDEM TARTRATE 10 MG/1
10 TABLET ORAL NIGHTLY PRN
Qty: 14 TABLET | Refills: 0 | Status: SHIPPED | OUTPATIENT
Start: 2024-10-01 | End: 2025-02-04 | Stop reason: SDUPTHER

## 2024-10-04 DIAGNOSIS — L65.9 ALOPECIA: ICD-10-CM

## 2024-10-04 RX ORDER — FINASTERIDE 1 MG/1
1 TABLET, FILM COATED ORAL DAILY
Qty: 90 TABLET | Refills: 0 | Status: SHIPPED | OUTPATIENT
Start: 2024-10-04

## 2024-12-09 ENCOUNTER — OFFICE VISIT (OUTPATIENT)
Age: 39
End: 2024-12-09
Payer: COMMERCIAL

## 2024-12-09 VITALS
RESPIRATION RATE: 16 BRPM | SYSTOLIC BLOOD PRESSURE: 122 MMHG | HEIGHT: 69 IN | TEMPERATURE: 97.2 F | DIASTOLIC BLOOD PRESSURE: 74 MMHG | WEIGHT: 160 LBS | HEART RATE: 87 BPM | BODY MASS INDEX: 23.7 KG/M2

## 2024-12-09 DIAGNOSIS — J01.90 ACUTE NON-RECURRENT SINUSITIS, UNSPECIFIED LOCATION: Primary | ICD-10-CM

## 2024-12-09 PROCEDURE — 99213 OFFICE O/P EST LOW 20 MIN: CPT | Performed by: FAMILY MEDICINE

## 2024-12-09 SDOH — ECONOMIC STABILITY: INCOME INSECURITY: HOW HARD IS IT FOR YOU TO PAY FOR THE VERY BASICS LIKE FOOD, HOUSING, MEDICAL CARE, AND HEATING?: NOT HARD AT ALL

## 2024-12-09 SDOH — ECONOMIC STABILITY: FOOD INSECURITY: WITHIN THE PAST 12 MONTHS, YOU WORRIED THAT YOUR FOOD WOULD RUN OUT BEFORE YOU GOT MONEY TO BUY MORE.: NEVER TRUE

## 2024-12-09 SDOH — ECONOMIC STABILITY: FOOD INSECURITY: WITHIN THE PAST 12 MONTHS, THE FOOD YOU BOUGHT JUST DIDN'T LAST AND YOU DIDN'T HAVE MONEY TO GET MORE.: NEVER TRUE

## 2024-12-09 ASSESSMENT — ENCOUNTER SYMPTOMS
SORE THROAT: 0
NAUSEA: 0
RESPIRATORY NEGATIVE: 1
VOMITING: 0
GASTROINTESTINAL NEGATIVE: 1
COUGH: 0
DIARRHEA: 0
ABDOMINAL PAIN: 0
SINUS PAIN: 1
SINUS PRESSURE: 1
SHORTNESS OF BREATH: 0

## 2024-12-09 ASSESSMENT — VISUAL ACUITY: OU: 1

## 2024-12-09 NOTE — PATIENT INSTRUCTIONS
Thank you for your visit today to the [facility]. It was our pleasure to provide the best possible care for you today.    As we discussed, please take note of the following:  -  your medication from the pharmacy and take as directed.  - No follow-ups on file.    Call the office at [phone] with any questions or concerns.

## 2024-12-09 NOTE — PROGRESS NOTES
[facility]  [address] [place] [address]  Phone: [phone]  Fax: [phone]      Date of Visit:  2024  Patient Name: Alex Mcgowan   Patient :  1985     HPI:     Chief Complaint   Patient presents with    possible ear infection     And sinus infection. Family in the home is sick as well. Patient states that it started 1 week along with stuffy nose. Pain in the bilateral ear.  Patient is flying for work to [place] on Wednesday and doesn't want to fly without being treated.        Alex Mcgowan is a 39 y.o. male who presents today to discuss the above. Patient states he has tried a Neti pot with some improvement. He has tried to blow his nose and feels that there is fluid in his ears. He also feels that there is pain in both ears. No ear drainage. No nasal drainage. He does report a frontal sinus headache. He reports chills, but no fever. Others in the household have been having chills. He did have a similar episodes in May 2024 and was treated with Augmentin, which recalls helped.    I personally reviewed the patient's past medical history, current medications, allergies, surgical history, family history and social history.  Updates were made as necessary.    REVIEW OF SYSTEMS      Review of Systems   Constitutional:  Positive for chills. Negative for fever.   HENT:  Positive for ear pain, sinus pressure and sinus pain. Negative for congestion, ear discharge, hearing loss, postnasal drip and sore throat.    Respiratory: Negative.  Negative for cough and shortness of breath.    Cardiovascular: Negative.  Negative for chest pain.   Gastrointestinal: Negative.  Negative for abdominal pain, diarrhea, nausea and vomiting.   Genitourinary: Negative.    Neurological: Negative.    All other systems reviewed and are negative.      REVIEWED INFORMATION      Allergies   Allergen Reactions    Azithromycin      Other reaction(s):

## 2024-12-29 DIAGNOSIS — L65.9 ALOPECIA: ICD-10-CM

## 2024-12-30 RX ORDER — FINASTERIDE 1 MG/1
1 TABLET, FILM COATED ORAL DAILY
Qty: 90 TABLET | Refills: 0 | Status: SHIPPED | OUTPATIENT
Start: 2024-12-30

## 2025-01-02 RX ORDER — EZETIMIBE 10 MG/1
10 TABLET ORAL DAILY
Qty: 90 TABLET | Refills: 3 | Status: SHIPPED | OUTPATIENT
Start: 2025-01-02 | End: 2026-03-28

## 2025-01-02 RX ORDER — ROSUVASTATIN CALCIUM 5 MG/1
5 TABLET, COATED ORAL DAILY
Qty: 90 TABLET | Refills: 3 | Status: SHIPPED | OUTPATIENT
Start: 2025-01-02 | End: 2026-03-28

## 2025-02-04 RX ORDER — ZOLPIDEM TARTRATE 10 MG/1
10 TABLET ORAL NIGHTLY PRN
Qty: 14 TABLET | Refills: 0 | Status: SHIPPED | OUTPATIENT
Start: 2025-02-04 | End: 2025-02-18

## 2025-02-04 NOTE — TELEPHONE ENCOUNTER
Dr. Smooth Deluna is out of the office.  I have sent the script for you as requested.    If you have questions, please let me know.  -Dr. Sher

## 2025-04-22 DIAGNOSIS — L65.9 ALOPECIA: ICD-10-CM

## 2025-04-22 RX ORDER — FINASTERIDE 1 MG/1
1 TABLET, FILM COATED ORAL DAILY
Qty: 90 TABLET | Refills: 0 | Status: SHIPPED | OUTPATIENT
Start: 2025-04-22

## 2025-05-19 NOTE — PATIENT INSTRUCTIONS

## 2025-05-19 NOTE — PROGRESS NOTES
[facility]  [facility]  [address] [place] [address]  Phone: [phone]  Fax: [phone]       Date of Visit:  2025  Patient Name: Alex Mcgowan   Patient :  1985     CHIEF COMPLAINT:     Alex Mcgowan is a 40 y.o. male who presents today for an general visit to be evaluated for the following condition(s):  Chief Complaint   Patient presents with    Annual Exam       HISTORY OF PRESENT ILLNESS      HPI  Theodora presents for a well man visit.  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  He receives calls at various times of day due to time change.  He admits to not getting good sleep and taking longer to adjust with jet lag.  He uses melatonin as needed.  Due to his hours he is not getting any exercise.  He tries to maintain a healthy diet.  He is seeing his eye doctor every 2 years and dentist twice per year.  He has a history of migraine headaches and has been getting headaches of some sort 3 times per week recently.  He has seen Dr. Delaney for evaluation of his migraines and would like to be seen again.  In addition has history of kidney cysts and has been followed by urologist however would like a second opinion from a nephrologist regarding prognosis and blood pressure recommendations.  He continues rosuvastatin for dyslipidemia.  Review of immunizations shows he is current.  REVIEW OF SYSTEMS     Review of Systems   Constitutional: Negative.    HENT: Negative.     Eyes: Negative.    Respiratory: Negative.     Cardiovascular: Negative.    Gastrointestinal: Negative.    Endocrine: Negative.    Genitourinary: Negative.    Musculoskeletal: Negative.    Allergic/Immunologic: Negative.    Neurological:  Positive for headaches.   Hematological: Negative.    Psychiatric/Behavioral: Negative.         REVIEWED INFORMATION

## 2025-05-20 ENCOUNTER — OFFICE VISIT (OUTPATIENT)
Age: 40
End: 2025-05-20
Payer: COMMERCIAL

## 2025-05-20 VITALS
HEART RATE: 72 BPM | BODY MASS INDEX: 23.28 KG/M2 | RESPIRATION RATE: 16 BRPM | TEMPERATURE: 97.6 F | WEIGHT: 157.2 LBS | SYSTOLIC BLOOD PRESSURE: 132 MMHG | HEIGHT: 69 IN | DIASTOLIC BLOOD PRESSURE: 87 MMHG

## 2025-05-20 DIAGNOSIS — N28.1 KIDNEY CYSTS: ICD-10-CM

## 2025-05-20 DIAGNOSIS — G43.909 MIGRAINE WITHOUT STATUS MIGRAINOSUS, NOT INTRACTABLE, UNSPECIFIED MIGRAINE TYPE: ICD-10-CM

## 2025-05-20 DIAGNOSIS — E78.2 MIXED HYPERLIPIDEMIA: ICD-10-CM

## 2025-05-20 DIAGNOSIS — Z00.00 VISIT FOR WELL MAN HEALTH CHECK: Primary | ICD-10-CM

## 2025-05-20 PROCEDURE — 99396 PREV VISIT EST AGE 40-64: CPT | Performed by: FAMILY MEDICINE

## 2025-05-20 SDOH — ECONOMIC STABILITY: FOOD INSECURITY: WITHIN THE PAST 12 MONTHS, YOU WORRIED THAT YOUR FOOD WOULD RUN OUT BEFORE YOU GOT MONEY TO BUY MORE.: NEVER TRUE

## 2025-05-20 SDOH — ECONOMIC STABILITY: FOOD INSECURITY: WITHIN THE PAST 12 MONTHS, THE FOOD YOU BOUGHT JUST DIDN'T LAST AND YOU DIDN'T HAVE MONEY TO GET MORE.: NEVER TRUE

## 2025-05-20 ASSESSMENT — ENCOUNTER SYMPTOMS
EYES NEGATIVE: 1
GASTROINTESTINAL NEGATIVE: 1
RESPIRATORY NEGATIVE: 1
ALLERGIC/IMMUNOLOGIC NEGATIVE: 1

## 2025-05-20 ASSESSMENT — PATIENT HEALTH QUESTIONNAIRE - PHQ9
SUM OF ALL RESPONSES TO PHQ QUESTIONS 1-9: 0
2. FEELING DOWN, DEPRESSED OR HOPELESS: NOT AT ALL
SUM OF ALL RESPONSES TO PHQ QUESTIONS 1-9: 0
1. LITTLE INTEREST OR PLEASURE IN DOING THINGS: NOT AT ALL
SUM OF ALL RESPONSES TO PHQ QUESTIONS 1-9: 0
SUM OF ALL RESPONSES TO PHQ QUESTIONS 1-9: 0

## 2025-07-17 DIAGNOSIS — L65.9 ALOPECIA: ICD-10-CM

## 2025-07-17 RX ORDER — FINASTERIDE 1 MG/1
1 TABLET, FILM COATED ORAL DAILY
Qty: 90 TABLET | Refills: 0 | Status: SHIPPED | OUTPATIENT
Start: 2025-07-17